# Patient Record
Sex: FEMALE | Race: WHITE | NOT HISPANIC OR LATINO | URBAN - METROPOLITAN AREA
[De-identification: names, ages, dates, MRNs, and addresses within clinical notes are randomized per-mention and may not be internally consistent; named-entity substitution may affect disease eponyms.]

---

## 2017-10-14 ENCOUNTER — HOSPITAL ENCOUNTER (OUTPATIENT)
Dept: RADIOLOGY | Facility: MEDICAL CENTER | Age: 57
End: 2017-10-14

## 2017-10-14 ENCOUNTER — APPOINTMENT (OUTPATIENT)
Dept: RADIOLOGY | Facility: MEDICAL CENTER | Age: 57
DRG: 066 | End: 2017-10-14
Attending: EMERGENCY MEDICINE
Payer: COMMERCIAL

## 2017-10-14 ENCOUNTER — RESOLUTE PROFESSIONAL BILLING HOSPITAL PROF FEE (OUTPATIENT)
Dept: HOSPITALIST | Facility: MEDICAL CENTER | Age: 57
End: 2017-10-14
Payer: COMMERCIAL

## 2017-10-14 ENCOUNTER — HOSPITAL ENCOUNTER (INPATIENT)
Facility: MEDICAL CENTER | Age: 57
LOS: 1 days | DRG: 066 | End: 2017-10-15
Attending: EMERGENCY MEDICINE | Admitting: INTERNAL MEDICINE
Payer: COMMERCIAL

## 2017-10-14 DIAGNOSIS — Z86.39 HISTORY OF DIABETES MELLITUS: ICD-10-CM

## 2017-10-14 DIAGNOSIS — R27.0 ATAXIA: ICD-10-CM

## 2017-10-14 DIAGNOSIS — Z86.39 HISTORY OF HYPOTHYROIDISM: ICD-10-CM

## 2017-10-14 DIAGNOSIS — R42 VERTIGO: ICD-10-CM

## 2017-10-14 PROBLEM — I63.9 CVA (CEREBRAL VASCULAR ACCIDENT) (HCC): Status: ACTIVE | Noted: 2017-10-14

## 2017-10-14 LAB
ALBUMIN SERPL BCP-MCNC: 3.8 G/DL (ref 3.2–4.9)
ALBUMIN/GLOB SERPL: 1.4 G/DL
ALP SERPL-CCNC: 92 U/L (ref 30–99)
ALT SERPL-CCNC: 26 U/L (ref 2–50)
ANION GAP SERPL CALC-SCNC: 7 MMOL/L (ref 0–11.9)
AST SERPL-CCNC: 36 U/L (ref 12–45)
BASOPHILS # BLD AUTO: 0.7 % (ref 0–1.8)
BASOPHILS # BLD: 0.05 K/UL (ref 0–0.12)
BILIRUB SERPL-MCNC: 0.5 MG/DL (ref 0.1–1.5)
BUN SERPL-MCNC: 12 MG/DL (ref 8–22)
CALCIUM SERPL-MCNC: 8.5 MG/DL (ref 8.5–10.5)
CHLORIDE SERPL-SCNC: 104 MMOL/L (ref 96–112)
CO2 SERPL-SCNC: 24 MMOL/L (ref 20–33)
CREAT SERPL-MCNC: 0.52 MG/DL (ref 0.5–1.4)
EOSINOPHIL # BLD AUTO: 0.07 K/UL (ref 0–0.51)
EOSINOPHIL NFR BLD: 1 % (ref 0–6.9)
ERYTHROCYTE [DISTWIDTH] IN BLOOD BY AUTOMATED COUNT: 42.5 FL (ref 35.9–50)
EST. AVERAGE GLUCOSE BLD GHB EST-MCNC: 192 MG/DL
GFR SERPL CREATININE-BSD FRML MDRD: >60 ML/MIN/1.73 M 2
GLOBULIN SER CALC-MCNC: 2.7 G/DL (ref 1.9–3.5)
GLUCOSE BLD-MCNC: 106 MG/DL (ref 65–99)
GLUCOSE BLD-MCNC: 114 MG/DL (ref 65–99)
GLUCOSE BLD-MCNC: 237 MG/DL (ref 65–99)
GLUCOSE SERPL-MCNC: 198 MG/DL (ref 65–99)
HBA1C MFR BLD: 8.3 % (ref 0–5.6)
HCT VFR BLD AUTO: 37.5 % (ref 37–47)
HGB BLD-MCNC: 12.9 G/DL (ref 12–16)
IMM GRANULOCYTES # BLD AUTO: 0.02 K/UL (ref 0–0.11)
IMM GRANULOCYTES NFR BLD AUTO: 0.3 % (ref 0–0.9)
INR PPP: 0.95 (ref 0.87–1.13)
LYMPHOCYTES # BLD AUTO: 1.23 K/UL (ref 1–4.8)
LYMPHOCYTES NFR BLD: 17.4 % (ref 22–41)
MCH RBC QN AUTO: 32.7 PG (ref 27–33)
MCHC RBC AUTO-ENTMCNC: 34.4 G/DL (ref 33.6–35)
MCV RBC AUTO: 95.2 FL (ref 81.4–97.8)
MONOCYTES # BLD AUTO: 0.36 K/UL (ref 0–0.85)
MONOCYTES NFR BLD AUTO: 5.1 % (ref 0–13.4)
NEUTROPHILS # BLD AUTO: 5.34 K/UL (ref 2–7.15)
NEUTROPHILS NFR BLD: 75.5 % (ref 44–72)
NRBC # BLD AUTO: 0 K/UL
NRBC BLD AUTO-RTO: 0 /100 WBC
PLATELET # BLD AUTO: 312 K/UL (ref 164–446)
PMV BLD AUTO: 9.4 FL (ref 9–12.9)
POTASSIUM SERPL-SCNC: 4 MMOL/L (ref 3.6–5.5)
PROT SERPL-MCNC: 6.5 G/DL (ref 6–8.2)
PROTHROMBIN TIME: 13 SEC (ref 12–14.6)
RBC # BLD AUTO: 3.94 M/UL (ref 4.2–5.4)
SODIUM SERPL-SCNC: 135 MMOL/L (ref 135–145)
TSH SERPL DL<=0.005 MIU/L-ACNC: 0.08 UIU/ML (ref 0.3–3.7)
WBC # BLD AUTO: 7.1 K/UL (ref 4.8–10.8)

## 2017-10-14 PROCEDURE — 99220 PR INITIAL OBSERVATION CARE,LEVL III: CPT | Performed by: INTERNAL MEDICINE

## 2017-10-14 PROCEDURE — 80053 COMPREHEN METABOLIC PANEL: CPT | Mod: 91

## 2017-10-14 PROCEDURE — 700102 HCHG RX REV CODE 250 W/ 637 OVERRIDE(OP): Performed by: INTERNAL MEDICINE

## 2017-10-14 PROCEDURE — 85610 PROTHROMBIN TIME: CPT

## 2017-10-14 PROCEDURE — 84443 ASSAY THYROID STIM HORMONE: CPT

## 2017-10-14 PROCEDURE — G0378 HOSPITAL OBSERVATION PER HR: HCPCS

## 2017-10-14 PROCEDURE — 85025 COMPLETE CBC W/AUTO DIFF WBC: CPT | Mod: 91

## 2017-10-14 PROCEDURE — 99285 EMERGENCY DEPT VISIT HI MDM: CPT

## 2017-10-14 PROCEDURE — 70498 CT ANGIOGRAPHY NECK: CPT

## 2017-10-14 PROCEDURE — 36415 COLL VENOUS BLD VENIPUNCTURE: CPT

## 2017-10-14 PROCEDURE — 83036 HEMOGLOBIN GLYCOSYLATED A1C: CPT

## 2017-10-14 PROCEDURE — 93005 ELECTROCARDIOGRAM TRACING: CPT | Performed by: EMERGENCY MEDICINE

## 2017-10-14 PROCEDURE — 82962 GLUCOSE BLOOD TEST: CPT | Mod: 91

## 2017-10-14 PROCEDURE — 70496 CT ANGIOGRAPHY HEAD: CPT

## 2017-10-14 PROCEDURE — A9270 NON-COVERED ITEM OR SERVICE: HCPCS | Performed by: INTERNAL MEDICINE

## 2017-10-14 PROCEDURE — 700117 HCHG RX CONTRAST REV CODE 255: Performed by: EMERGENCY MEDICINE

## 2017-10-14 PROCEDURE — 70551 MRI BRAIN STEM W/O DYE: CPT

## 2017-10-14 RX ORDER — MORPHINE SULFATE 4 MG/ML
2 INJECTION, SOLUTION INTRAMUSCULAR; INTRAVENOUS
Status: DISCONTINUED | OUTPATIENT
Start: 2017-10-14 | End: 2017-10-15 | Stop reason: HOSPADM

## 2017-10-14 RX ORDER — LABETALOL HYDROCHLORIDE 5 MG/ML
10 INJECTION, SOLUTION INTRAVENOUS EVERY 4 HOURS PRN
Status: DISCONTINUED | OUTPATIENT
Start: 2017-10-14 | End: 2017-10-15 | Stop reason: HOSPADM

## 2017-10-14 RX ORDER — DEXTROSE MONOHYDRATE 25 G/50ML
25 INJECTION, SOLUTION INTRAVENOUS
Status: DISCONTINUED | OUTPATIENT
Start: 2017-10-14 | End: 2017-10-15 | Stop reason: HOSPADM

## 2017-10-14 RX ORDER — INSULIN GLARGINE 100 [IU]/ML
1 INJECTION, SOLUTION SUBCUTANEOUS
Status: DISCONTINUED | OUTPATIENT
Start: 2017-10-15 | End: 2017-10-15 | Stop reason: HOSPADM

## 2017-10-14 RX ORDER — ONDANSETRON 2 MG/ML
4 INJECTION INTRAMUSCULAR; INTRAVENOUS EVERY 4 HOURS PRN
Status: DISCONTINUED | OUTPATIENT
Start: 2017-10-14 | End: 2017-10-15 | Stop reason: HOSPADM

## 2017-10-14 RX ORDER — ACETAMINOPHEN 325 MG/1
650 TABLET ORAL EVERY 6 HOURS PRN
Status: DISCONTINUED | OUTPATIENT
Start: 2017-10-14 | End: 2017-10-15 | Stop reason: HOSPADM

## 2017-10-14 RX ORDER — ATORVASTATIN CALCIUM 80 MG/1
80 TABLET, FILM COATED ORAL EVERY EVENING
Status: DISCONTINUED | OUTPATIENT
Start: 2017-10-14 | End: 2017-10-15 | Stop reason: HOSPADM

## 2017-10-14 RX ORDER — LEVOTHYROXINE SODIUM 112 UG/1
112 TABLET ORAL
Status: DISCONTINUED | OUTPATIENT
Start: 2017-10-15 | End: 2017-10-15 | Stop reason: HOSPADM

## 2017-10-14 RX ORDER — ASPIRIN 81 MG/1
324 TABLET, CHEWABLE ORAL DAILY
Status: DISCONTINUED | OUTPATIENT
Start: 2017-10-15 | End: 2017-10-15 | Stop reason: HOSPADM

## 2017-10-14 RX ORDER — PROMETHAZINE HYDROCHLORIDE 25 MG/1
12.5-25 SUPPOSITORY RECTAL EVERY 4 HOURS PRN
Status: DISCONTINUED | OUTPATIENT
Start: 2017-10-14 | End: 2017-10-15 | Stop reason: HOSPADM

## 2017-10-14 RX ORDER — OXYCODONE HYDROCHLORIDE 5 MG/1
2.5 TABLET ORAL
Status: DISCONTINUED | OUTPATIENT
Start: 2017-10-14 | End: 2017-10-15 | Stop reason: HOSPADM

## 2017-10-14 RX ORDER — AMOXICILLIN 250 MG
2 CAPSULE ORAL 2 TIMES DAILY
Status: DISCONTINUED | OUTPATIENT
Start: 2017-10-14 | End: 2017-10-15 | Stop reason: HOSPADM

## 2017-10-14 RX ORDER — INSULIN GLARGINE 100 [IU]/ML
7 INJECTION, SOLUTION SUBCUTANEOUS NIGHTLY
COMMUNITY

## 2017-10-14 RX ORDER — ASPIRIN 325 MG
325 TABLET ORAL DAILY
Status: DISCONTINUED | OUTPATIENT
Start: 2017-10-15 | End: 2017-10-15 | Stop reason: HOSPADM

## 2017-10-14 RX ORDER — INSULIN GLARGINE 100 [IU]/ML
3 INJECTION, SOLUTION SUBCUTANEOUS EVERY EVENING
Status: DISCONTINUED | OUTPATIENT
Start: 2017-10-14 | End: 2017-10-15 | Stop reason: HOSPADM

## 2017-10-14 RX ORDER — LEVOTHYROXINE SODIUM 112 UG/1
112 TABLET ORAL DAILY
Status: DISCONTINUED | OUTPATIENT
Start: 2017-10-14 | End: 2017-10-14

## 2017-10-14 RX ORDER — ASPIRIN 300 MG/1
300 SUPPOSITORY RECTAL DAILY
Status: DISCONTINUED | OUTPATIENT
Start: 2017-10-15 | End: 2017-10-15 | Stop reason: HOSPADM

## 2017-10-14 RX ORDER — POLYETHYLENE GLYCOL 3350 17 G/17G
1 POWDER, FOR SOLUTION ORAL
Status: DISCONTINUED | OUTPATIENT
Start: 2017-10-14 | End: 2017-10-15 | Stop reason: HOSPADM

## 2017-10-14 RX ORDER — OXYCODONE HYDROCHLORIDE 5 MG/1
5 TABLET ORAL
Status: DISCONTINUED | OUTPATIENT
Start: 2017-10-14 | End: 2017-10-15 | Stop reason: HOSPADM

## 2017-10-14 RX ORDER — PROMETHAZINE HYDROCHLORIDE 25 MG/1
12.5-25 TABLET ORAL EVERY 4 HOURS PRN
Status: DISCONTINUED | OUTPATIENT
Start: 2017-10-14 | End: 2017-10-15 | Stop reason: HOSPADM

## 2017-10-14 RX ORDER — ONDANSETRON 4 MG/1
4 TABLET, ORALLY DISINTEGRATING ORAL EVERY 4 HOURS PRN
Status: DISCONTINUED | OUTPATIENT
Start: 2017-10-14 | End: 2017-10-15 | Stop reason: HOSPADM

## 2017-10-14 RX ORDER — HYDRALAZINE HYDROCHLORIDE 20 MG/ML
10 INJECTION INTRAMUSCULAR; INTRAVENOUS
Status: DISCONTINUED | OUTPATIENT
Start: 2017-10-14 | End: 2017-10-15 | Stop reason: HOSPADM

## 2017-10-14 RX ORDER — LEVOTHYROXINE SODIUM 0.1 MG/1
100 TABLET ORAL
Status: DISCONTINUED | OUTPATIENT
Start: 2017-10-15 | End: 2017-10-14

## 2017-10-14 RX ORDER — BISACODYL 10 MG
10 SUPPOSITORY, RECTAL RECTAL
Status: DISCONTINUED | OUTPATIENT
Start: 2017-10-14 | End: 2017-10-15 | Stop reason: HOSPADM

## 2017-10-14 RX ADMIN — INSULIN LISPRO 2 UNITS: 100 INJECTION, SOLUTION INTRAVENOUS; SUBCUTANEOUS at 20:44

## 2017-10-14 RX ADMIN — INSULIN GLARGINE 3 UNITS: 100 INJECTION, SOLUTION SUBCUTANEOUS at 20:43

## 2017-10-14 RX ADMIN — IOHEXOL 100 ML: 350 INJECTION, SOLUTION INTRAVENOUS at 10:55

## 2017-10-14 ASSESSMENT — ENCOUNTER SYMPTOMS
NAUSEA: 0
COUGH: 0
CHILLS: 0
NERVOUS/ANXIOUS: 0
PHOTOPHOBIA: 0
DEPRESSION: 0
TREMORS: 0
SPUTUM PRODUCTION: 0
VOMITING: 0
CONSTIPATION: 0
SHORTNESS OF BREATH: 0
NECK PAIN: 0
BLURRED VISION: 0
SEIZURES: 0
WEAKNESS: 0
DIARRHEA: 0
FALLS: 0
FEVER: 0
VOMITING: 1
BRUISES/BLEEDS EASILY: 0
SENSORY CHANGE: 0
ABDOMINAL PAIN: 0
FOCAL WEAKNESS: 0
MYALGIAS: 0
PALPITATIONS: 0
NAUSEA: 1
CLAUDICATION: 0
BACK PAIN: 0
HALLUCINATIONS: 0
DIZZINESS: 1
DOUBLE VISION: 0
HEADACHES: 0
SPEECH CHANGE: 0
TINGLING: 0
FLANK PAIN: 0

## 2017-10-14 ASSESSMENT — COPD QUESTIONNAIRES
DO YOU EVER COUGH UP ANY MUCUS OR PHLEGM?: NO/ONLY WITH OCCASIONAL COLDS OR INFECTIONS
DURING THE PAST 4 WEEKS HOW MUCH DID YOU FEEL SHORT OF BREATH: NONE/LITTLE OF THE TIME
HAVE YOU SMOKED AT LEAST 100 CIGARETTES IN YOUR ENTIRE LIFE: NO/DON'T KNOW
COPD SCREENING SCORE: 1

## 2017-10-14 ASSESSMENT — COGNITIVE AND FUNCTIONAL STATUS - GENERAL
DRESSING REGULAR UPPER BODY CLOTHING: A LITTLE
DAILY ACTIVITIY SCORE: 17
PERSONAL GROOMING: A LITTLE
CLIMB 3 TO 5 STEPS WITH RAILING: A LOT
DRESSING REGULAR LOWER BODY CLOTHING: A LITTLE
STANDING UP FROM CHAIR USING ARMS: A LOT
CLIMB 3 TO 5 STEPS WITH RAILING: A LOT
SUGGESTED CMS G CODE MODIFIER MOBILITY: CK
PERSONAL GROOMING: A LITTLE
MOVING TO AND FROM BED TO CHAIR: A LITTLE
MOBILITY SCORE: 16
MOVING TO AND FROM BED TO CHAIR: A LITTLE
DRESSING REGULAR UPPER BODY CLOTHING: A LITTLE
MOVING FROM LYING ON BACK TO SITTING ON SIDE OF FLAT BED: A LITTLE
HELP NEEDED FOR BATHING: A LOT
WALKING IN HOSPITAL ROOM: A LOT
TOILETING: A LOT
SUGGESTED CMS G CODE MODIFIER MOBILITY: CK
HELP NEEDED FOR BATHING: A LOT
SUGGESTED CMS G CODE MODIFIER DAILY ACTIVITY: CK
MOVING FROM LYING ON BACK TO SITTING ON SIDE OF FLAT BED: A LITTLE
MOBILITY SCORE: 16
DRESSING REGULAR LOWER BODY CLOTHING: A LITTLE
SUGGESTED CMS G CODE MODIFIER DAILY ACTIVITY: CK
DAILY ACTIVITIY SCORE: 17
WALKING IN HOSPITAL ROOM: A LOT
TOILETING: A LOT
STANDING UP FROM CHAIR USING ARMS: A LOT

## 2017-10-14 ASSESSMENT — LIFESTYLE VARIABLES
HOW MANY TIMES IN THE PAST YEAR HAVE YOU HAD 5 OR MORE DRINKS IN A DAY: 3
EVER HAD A DRINK FIRST THING IN THE MORNING TO STEADY YOUR NERVES TO GET RID OF A HANGOVER: NO
AVERAGE NUMBER OF DAYS PER WEEK YOU HAVE A DRINK CONTAINING ALCOHOL: 6
DO YOU DRINK ALCOHOL: YES
EVER FELT BAD OR GUILTY ABOUT YOUR DRINKING: NO
EVER_SMOKED: NEVER
SUBSTANCE_ABUSE: 0
ON A TYPICAL DAY WHEN YOU DRINK ALCOHOL HOW MANY DRINKS DO YOU HAVE: 2
DO YOU DRINK ALCOHOL: NO
TOTAL SCORE: 1
HAVE YOU EVER FELT YOU SHOULD CUT DOWN ON YOUR DRINKING: YES
TOTAL SCORE: 1
HAVE PEOPLE ANNOYED YOU BY CRITICIZING YOUR DRINKING: NO
TOTAL SCORE: 1
CONSUMPTION TOTAL: POSITIVE

## 2017-10-14 ASSESSMENT — PAIN SCALES - GENERAL: PAINLEVEL_OUTOF10: 0

## 2017-10-14 NOTE — ED NOTES
Pt requesting to take 1 unit of novolog & 1 unit of lantus.  Pt has has her own medications at bedside.  ERP aware.

## 2017-10-14 NOTE — ED NOTES
"Pt bib [place] [facility], transfer from [facility] for neuro evaluation.  Pt states she woke up at 4am \"not feeling right.\"  Pt reports feeling \"off balance- I am so lightheaded that I can't walk correctly.\"  Negative neuro defects.  Reports blood sugar of 370 while at home- known diabetic.  Pt arrives a&ox4.  EKG done.  Blood drawn & sent to lab.  On cardiac monitor.  Chart up for ERP evaluation.   "

## 2017-10-14 NOTE — CONSULTS
NEUROLOGY NOTE    Referring Physician  ER      CHIEF COMPLAINT:  Woke up with unsteadiness-- not able to stand steady, dizzy( denied real vertigo)  Chief Complaint   Patient presents with   • Lightheadedness   • Other     sent from [facility] for neuro evaluation          IMPRESSION:    1. Acute onset of unsteadiness-- this morning-- small stroke is likely  2. Hx of DM and Hypothyrodism    PLAN/RECOMMENDATIONS:      Not a TPA candidate since this is wake up stroke  Will get MRI of brain  NIH score 1    Besides stroke, seizure, migraine or psychogenic are possible but less likely compared with small stroke in posterior fossa or thalamus or -- like lacunar stroke    Advise stroke work up     CTA of head and neck are negative     SIGNATURE:  Loren Santana          PRESENT ILLNESS:   Woke up with unsteadiness-- not able to stand steady, dizzy( denied real vertigo)    The patient Stated that she felt well yesterday. She went awakened by the fire alarm sensor, indicating that the battery was low. She got up to go to the bathroom and she typically does during the night and noted that right off the bat, she was very ataxic. She fell down back onto the bed and then when she got up again, she had to use furniture, states throughout her bedroom and into the bathroom in order to safely get to the bathroom. Once her she arrived in the bathroom she had an episode of vomiting.  PAST MEDICAL HISTORY:  Past Medical History:   Diagnosis Date   • Diabetes (CMS-HCC)     type 1   • Hypothyroid        PAST SURGICAL HISTORY:  No past surgical history on file.    FAMILY HISTORY:  Family History   Problem Relation Age of Onset   • Alcohol/Drug Neg Hx        SOCIAL HISTORY:  Social History     Social History   • Marital status:      Spouse name: N/A   • Number of children: N/A   • Years of education: N/A     Occupational History   • Not on file.     Social History Main Topics   • Smoking status: Never Smoker   • Smokeless tobacco: Never Used    • Alcohol use 3.5 oz/week     7 Glasses of wine per week   • Drug use: No   • Sexual activity: Not on file     Other Topics Concern   • Caffeine Concern No   • Stress Concern Yes     Social History Narrative   • No narrative on file     ALLERGIES:  Allergies   Allergen Reactions   • Latex      TOBHX  History   Smoking Status   • Never Smoker   Smokeless Tobacco   • Never Used     ALCHX  History   Alcohol Use   • 3.5 oz/week   • 7 Glasses of wine per week     DRUGHX  History   Drug Use No           MEDICATIONS:  Current Facility-Administered Medications   Medication Dose   • senna-docusate (PERICOLACE or SENOKOT S) 8.6-50 MG per tablet 2 Tab  2 Tab    And   • polyethylene glycol/lytes (MIRALAX) PACKET 1 Packet  1 Packet    And   • magnesium hydroxide (MILK OF MAGNESIA) suspension 30 mL  30 mL    And   • bisacodyl (DULCOLAX) suppository 10 mg  10 mg   • acetaminophen (TYLENOL) tablet 650 mg  650 mg   • Pharmacy Consult Request ...Pain Management Review      And   • oxycodone immediate-release (ROXICODONE) tablet 2.5 mg  2.5 mg    And   • oxycodone immediate-release (ROXICODONE) tablet 5 mg  5 mg    And   • morphine (pf) 4 mg/ml injection 2 mg  2 mg   • labetalol (NORMODYNE,TRANDATE) injection 10 mg  10 mg    Or   • hydrALAZINE (APRESOLINE) injection 10 mg  10 mg   • atorvastatin (LIPITOR) tablet 80 mg  80 mg   • [START ON 10/15/2017] aspirin (ASA) tablet 325 mg  325 mg    Or   • [START ON 10/15/2017] aspirin (ASA) chewable tab 324 mg  324 mg    Or   • [START ON 10/15/2017] aspirin (ASA) suppository 300 mg  300 mg   • insulin lispro (HUMALOG) injection 1-6 Units  1-6 Units   • glucose 4 g chewable tablet 16 g  16 g    And   • dextrose 50% (D50W) injection 25 mL  25 mL   • ondansetron (ZOFRAN) syringe/vial injection 4 mg  4 mg   • ondansetron (ZOFRAN ODT) dispertab 4 mg  4 mg   • promethazine (PHENERGAN) tablet 12.5-25 mg  12.5-25 mg   • promethazine (PHENERGAN) suppository 12.5-25 mg  12.5-25 mg   • prochlorperazine  "(COMPAZINE) injection 5-10 mg  5-10 mg   • oyster shell calcium/vitamin D 250-125 MG-UNIT tablet 2 Tab  2 Tab   • [START ON 10/15/2017] levothyroxine (SYNTHROID) tablet 100 mcg  100 mcg     Current Outpatient Prescriptions   Medication   • insulin glargine (LANTUS) 100 UNIT/ML Solution   • insulin aspart (NOVOLOG) 100 UNIT/ML Solution   • Calcium Carbonate-Vitamin D (CALTRATE 600+D PO)   • levothyroxine (SYNTHROID) 112 MCG TABS       REVIEW OF SYSTEM:    Constitutional: Denies fevers, Denies weight changes   Eyes: Denies changes in vision, no eye pain   Ears/Nose/Throat/Mouth: Denies nasal congestion or sore throat   Cardiovascular: Denies chest pain or palpitations   Respiratory: Denies SOB.   Gastrointestinal/Hepatic: Denies abdominal pain, nausea, vomiting, diarrhea, constipation or GI bleeding   Genitourinary: Denies bladder dysfunction, dysuria or frequency   Musculoskeletal/Rheum: Denies joint pain and swelling   Skin/Breast: Denies rash, denies breast lumps or discharge   Neurological: Denies headache, confusion, memory loss or focal weakness/parasthesias   Psychiatric: denies mood disorder   Endocrine: DM, hypothyrodisim  Heme/Oncology/Lymph Nodes: Denies enlarged lymph nodes, denies brusing or known bleeding disorder   Allergic/Immunologic: Denies hx of allergies   All other systems were reviewed and are negative (AMA/CMS criteria)       PHYSICAL AND NEUROLOGICAL EXMAINATIONS:  VITAL SIGNS: /59   Pulse 64   Temp 36.3 °C (97.4 °F)   Resp 17   Ht 1.575 m (5' 2\")   Wt 42.6 kg (94 lb)   SpO2 94%   BMI 17.19 kg/m²   CURRENT WEIGHT:   BMI: Body mass index is 17.19 kg/m².  PREVIOUS WEIGHTS:  Wt Readings from Last 25 Encounters:   10/14/17 42.6 kg (94 lb)   10/14/17 42.8 kg (94 lb 4.8 oz)   08/11/15 45.8 kg (101 lb)   10/22/14 45.7 kg (100 lb 12.8 oz)       General appearance of patient: WDWN(+) NAD(+)    EYES  o Fundus : Papilledem(-) Exudates(-) Hemorrhage(-)  Nervous System  Orientation to time, " place and person(+)  Memory normal(+)  Language: aphasia(-)  Knowledge: past(+) Current(+)  Attention(+)  Cranial Nerves  • Nerve 2: intact  • Nerve 3,4,6: intact  • Nerve 5 : intact  • Nerve 7: intact  • Nerve 8: intact  • Nerve 9 & 10: intact  • Nerve 11: intact  • Nerve 12: intact  Muscle Power and muscle tone: symmetric, normal in upper and lower  Sensory System: Pin sensation intact(+)  Reflexes: symmetric throughout  Cerebellar Function mild clumsiness over the left  Gait : Steady(-)  Heart and Vascular  Peripheral Vasucular system : Edema (-) Swelling(-)  RHB, Breathing sound clear  abdomen bowel sound normoactive  Extremities freely moveable  Joints no contracture       NEUROIMAGING: I reviewed the CT of brain       LAB:  Recent Labs      10/14/17   0445  10/14/17   0830   WBC  5.5  7.1   RBC  4.09*  3.94*   HEMOGLOBIN  13.6  12.9   HEMATOCRIT  38.7*  37.5   MCV  94.6  95.2   MCH  33.3*  32.7   MCHC  35.1  34.4   RDW  12.0  42.5   PLATELETCT  292  312   MPV  8.6  9.4           IMPRESSION:    1. Acute onset of unsteadiness-- this morning-- small stroke is likely    PLAN/RECOMMENDATIONS:      Not a TPA candidate since this is wake up stroke  Will get MRI of brain    Besides stroke, seizure, migraine or psychogenic are possible but less likely compared with small stroke in posterior fossa    Advise stroke work up     CTA of head and neck are negative     SIGNATURE:  Loren Santana

## 2017-10-14 NOTE — ED PROVIDER NOTES
ED Provider Note    ED Provider Note    Scribed for Kari Polo D.O. by Kari Polo. 10/14/2017, 8:27 AM.    Primary care provider: Pcp Pt States None  Means of arrival: EMS  History obtained from: Patient/family  History limited by: None    CHIEF COMPLAINT  Chief Complaint   Patient presents with   • Lightheadedness   • Other     sent [place] [facility] [place]        HPI  Slime Thacker is a 56 y.o. female who presents to the Emergency Department [place] [facility] [place] in need of a neuro consult and an MRI which was unable to be obtained at the prior facility. Patient gives the same history. Stating that she felt well yesterday. She's never had prior symptoms in the past. She went awakened by the fire alarm sensor, indicating that the battery was low. She got up to go to the bathroom and she typically does during the night and noted that right off the bat, she was very ataxic. She fell down back onto the bed and then when she got up again, she had to use furniture, states throughout her bedroom and into the bathroom in order to safely get to the bathroom. Once her she arrived in the bathroom she had an episode of vomiting.    Patient states that she was in her normal state of health yesterday she worked out and felt well. She went to the grocery store. She came home and ate dinner and felt fine before going to bed. Then she woke up at 4 AM because the fire alarm was sounding when she tried to get up to go to the bathroom she states she could not walk. She was very ataxic. He did up crawling to the bathroom and felt nauseated and ended up vomiting. She had difficulty redressing herself after using the bathroom. He describes vertiginous symptoms in the room spinning. She denies any headache, chest pain or shortness of breath. She does have a history of diabetes and she checked her blood sugar was elevated at 370. She requested that her  bring her to the hospital.  Movements particularly sitting up, caused increased vomiting. No urinary symptoms, fever chills or recent diarrhea. She denies any sensory changes. No isolated weaknesses. No visual changes or speech changes. No recent head trauma, falls or syncope.    REVIEW OF SYSTEMS  Review of Systems   Constitutional: Negative for fever.   HENT: Negative for congestion.    Eyes: Negative for blurred vision and double vision.   Respiratory: Negative for shortness of breath.    Cardiovascular: Negative for chest pain.   Gastrointestinal: Positive for nausea and vomiting. Negative for abdominal pain and diarrhea.   Genitourinary: Negative for dysuria and flank pain.   Musculoskeletal: Negative for falls and myalgias.   Skin: Negative for rash.   Neurological: Positive for dizziness. Negative for tingling, tremors, sensory change, speech change, focal weakness, seizures and headaches.   Endo/Heme/Allergies: Does not bruise/bleed easily.   Psychiatric/Behavioral: Negative for substance abuse.   All other systems reviewed and are negative.      PAST MEDICAL HISTORY   has a past medical history of Diabetes (CMS-[facility]) and Hypothyroid.    SURGICAL HISTORY  patient denies any surgical history    SOCIAL HISTORY  Social History   Substance Use Topics   • Smoking status: Never Smoker   • Smokeless tobacco: Never Used   • Alcohol use 3.5 oz/week     7 Glasses of wine per week      History   Drug Use No       FAMILY HISTORY  Family History   Problem Relation Age of Onset   • Alcohol/Drug Neg Hx        CURRENT MEDICATIONS  Home Medications     Reviewed by Thanh Mcleod (Pharmacy Tech) on 10/14/17 at 0933  Med List Status: Complete   Medication Last Dose Status   Calcium Carbonate-Vitamin D (CALTRATE 600+D PO) 10/13/2017 Active   insulin aspart (NOVOLOG) 100 UNIT/ML Solution 10/14/2017 Active   insulin glargine (LANTUS) 100 UNIT/ML Solution 10/13/2017 Active   levothyroxine (SYNTHROID) 112 MCG TABS 10/13/2017 Active           "      ALLERGIES  Allergies   Allergen Reactions   • Latex        PHYSICAL EXAM  VITAL SIGNS: /59   Pulse 72   Temp 36.3 °C (97.4 °F)   Resp (!) 21   Ht 1.575 m (5' 2\")   Wt 42.6 kg (94 lb)   SpO2 97%   BMI 17.19 kg/m²   Vitals reviewed.  Constitutional: Patient is oriented to person, place, and time. Appears well-developed and well-nourished. No distress.    Head: Normocephalic and atraumatic.   Ears: Normal external ears bilaterally. EOMI.  Mouth/Throat: Oropharynx is clear and moist  Eyes: Conjunctivae are normal. Pupils are equal, round, and reactive to light.   Cardiovascular: Normal rate, regular rhythm and normal heart sounds. Normal peripheral pulses.  Pulmonary/Chest: Effort normal and breath sounds normal. No respiratory distress, no wheezes, rhonchi, or rales.  Abdominal: Soft. Bowel sounds are normal. There is no tenderness, rebound or guarding, or peritoneal signs  Musculoskeletal: No edema and no tenderness.   Neurological: No focal deficits.  normal motor and sensory exam. Normal speech. Normal memory. Normal cognition. No nystagmus. Normal finger to nose on the right. Normal heel to shin on the right. With her finger to nose and heel to shin on the left, patient is slightly less smooth than on the right side.  Skin: Skin is warm and dry. No erythema. No pallor.   Psychiatric: Patient has a normal mood and affect.     LABS  Results for orders placed or performed during the hospital encounter of 10/14/17   CBC WITH DIFFERENTIAL   Result Value Ref Range    WBC 7.1 4.8 - 10.8 K/uL    RBC 3.94 (L) 4.20 - 5.40 M/uL    Hemoglobin 12.9 12.0 - 16.0 g/dL    Hematocrit 37.5 37.0 - 47.0 %    MCV 95.2 81.4 - 97.8 fL    MCH 32.7 27.0 - 33.0 pg    MCHC 34.4 33.6 - 35.0 g/dL    RDW 42.5 35.9 - 50.0 fL    Platelet Count 312 164 - 446 K/uL    MPV 9.4 9.0 - 12.9 fL    Neutrophils-Polys 75.50 (H) 44.00 - 72.00 %    Lymphocytes 17.40 (L) 22.00 - 41.00 %    Monocytes 5.10 0.00 - 13.40 %    Eosinophils 1.00 " 0.00 - 6.90 %    Basophils 0.70 0.00 - 1.80 %    Immature Granulocytes 0.30 0.00 - 0.90 %    Nucleated RBC 0.00 /100 WBC    Neutrophils (Absolute) 5.34 2.00 - 7.15 K/uL    Lymphs (Absolute) 1.23 1.00 - 4.80 K/uL    Monos (Absolute) 0.36 0.00 - 0.85 K/uL    Eos (Absolute) 0.07 0.00 - 0.51 K/uL    Baso (Absolute) 0.05 0.00 - 0.12 K/uL    Immature Granulocytes (abs) 0.02 0.00 - 0.11 K/uL    NRBC (Absolute) 0.00 K/uL   CMP   Result Value Ref Range    Sodium 135 135 - 145 mmol/L    Potassium 4.0 3.6 - 5.5 mmol/L    Chloride 104 96 - 112 mmol/L    Co2 24 20 - 33 mmol/L    Anion Gap 7.0 0.0 - 11.9    Glucose 198 (H) 65 - 99 mg/dL    Bun 12 8 - 22 mg/dL    Creatinine 0.52 0.50 - 1.40 mg/dL    Calcium 8.5 8.5 - 10.5 mg/dL    AST(SGOT) 36 12 - 45 U/L    ALT(SGPT) 26 2 - 50 U/L    Alkaline Phosphatase 92 30 - 99 U/L    Total Bilirubin 0.5 0.1 - 1.5 mg/dL    Albumin 3.8 3.2 - 4.9 g/dL    Total Protein 6.5 6.0 - 8.2 g/dL    Globulin 2.7 1.9 - 3.5 g/dL    A-G Ratio 1.4 g/dL   PROTHROMBIN TIME   Result Value Ref Range    PT 13.0 12.0 - 14.6 sec    INR 0.95 0.87 - 1.13   ESTIMATED GFR   Result Value Ref Range    GFR If African American >60 >60 mL/min/1.73 m 2    GFR If Non African American >60 >60 mL/min/1.73 m 2   EKG (ER)   Result Value Ref Range    Report       [facility] Emergency Dept.    Test Date:  2017-10-14  Pt Name:    BRITT TRAN                 Department: ER  MRN:        [id number]                      Room:        02  Gender:     F                            Technician: [id number]  :        1960                   Requested By:AVINASH CROSS  Order #:    [id number]                    Reading MD:    Measurements  Intervals                                Axis  Rate:       68                           P:          79  ME:         192                          QRS:        66  QRSD:       82                           T:          50  QT:         456  QTc:        486    Interpretive Statements  SINUS  RHYTHM  PROBABLE LEFT ATRIAL ABNORMALITY  LOW VOLTAGE WITH RIGHT AXIS DEVIATION  CONSIDER ANTERIOR INFARCT  BORDERLINE T ABNORMALITIES, ANTERIOR LEADS  No previous ECG available for comparison         All labs reviewed by me.    EKG Interpretation  Interpreted by me    Rhythm: normal sinus   Rate: 68  Axis: normal  Ectopy: none  Conduction: normal  ST Segments: no acute change  T Waves: Inversion in the anterior leads  Q Waves: none    Clinical Impression: There is no old EKG for comparison. Sinus rhythm 68. There is T-wave inversion in the anterior leads.     RADIOLOGY  CT-CTA HEAD WITH & W/O-POST PROCESS   Final Result      CT angiogram of the [place] within normal limits.      CT-CTA NECK WITH & W/O-POST PROCESSING   Final Result      CT angiogram of the neck within normal limits.      OUTSIDE IMAGES-CT HEAD   Final Result      MR-BRAIN-W/O    (Results Pending)     The radiologist's interpretation of all radiological studies have been reviewed by me.    COURSE & MEDICAL DECISION MAKING  Pertinent Labs & Imaging studies reviewed. (See chart for details)    Obtained and reviewed past medical records. Patient has no prior encounters in our EMR.  Records from previous facility. Patient was transferred from [facility]. Exam prior to arrival showed normal strength of her bilateral upper and lower extremities. Normal heel to toe testing of the right leg. The left leg is stuttering with heel to toe with jerking movements. Cranial nerves intact. Normal finger to nose was noted on the right. Left side has jerking movements in completing this task. She is noted to have a wide-based unsteady gait. EKG showed sinus bradycardia with old Q waves in V1, 2 and 3 and inverted T waves in V1 and V2. No acute ST segment elevation. CT head showed no acute cranial abnormalities. She was given IV fluids and Zofran. Labs showed an elevated glucose, negative troponin. She was not given TPA prior to arrival  "because this was considered a \"wake up stroke\". Blood sugar corrected after IV fluids. They were unable to obtain an MRI prompting her transfer to this facility. She was given aspirin as well as meclizine and another dose of Zofran prior to transfer.    8:27 AM - Patient seen and examined at bedside. Her NIH is zero. She is not a candidate for TPA as this is a \"wake up stroke\" the timing onset of symptoms is unknown. Patient is awake and alert. Daughter's ×2 and  at the bedside. Neuro exam seems improved from what was described by the prior ERP. Patient has pretty symmetric finger to nose and heel to shin on both sides. Her nausea is improved. She does not have a headache. I have paged neurology for consultation and will plan to get the patient admitted to the hospital. Regarding her EKG. She states that there is a Dr. Gamboa, an endocrinologist who she seen for many years who she thinks has access to an old EKG to perhaps compare and see if these T-wave inversions were present then. Cell phone number [phone] office number [phone]. The patient already left a message will attempt to contact him.     The differential diagnoses include but are not limited to: Posterior stroke versus positional vertigo versus TIA.    9:21 AM Dr. Capps from [facility], called as there was additional information regarding the patient's CT that was done here. The [facility] apparently, read the CT as negative. There are daytime radiologist, said that there may possibly be a small, extra-axial fluid collection, measuring about 3 mm. This could be a chronic subdural versus artifact. Radiologist recommended that the patient have an MRI. I discussed with the ER doctor called with this information in assured him, the patient will be having additional imaging.    9:14 AM discussed with Dr. Kirk, neurologist on call who at this time, recommend a CTA of the head and neck to evaluate the posterior " circulation. MRI needs to be done, but he states can be done at a later date. He will see the patient in consultation. He is aware, the patient will be admitted to the hospitalist service.     10:16 AM notified by nurse, that the patient got up to go to the bathroom and she was significantly ataxic. Patient requested to eat but have advised the nurse, she needs to be nothing by mouth until she has a swallow study upstairs. She is requesting to take her own insulin her sugar she checked herself, was 2:15. She wants to give herself one unit and I told the nurse, this is appropriate if she just documented appropriately.x    11:23 AM discussed with the hospitalist, Dr. Gilmore, who agrees to admit the patient to their service. CTA was overall unrevealing. An MRI has been ordered. He is aware, that Dr. charles has been consult on the patient and agrees to see her in consultation.    Patient be admitted in stable but guarded condition.        FINAL IMPRESSION  1. Ataxia    2. Vertigo    3. History of diabetes mellitus    4. History of hypothyroidism

## 2017-10-14 NOTE — ED NOTES
Med Rec completed per patient at bedside.  Allergies reviewed   No antibiotics within the last 30 days.    Patient stated she took 2 units of novolog today at 0400.

## 2017-10-14 NOTE — H&P
Hospital Medicine History and Physical    Date of Service  10/14/2017    Chief Complaint  Chief Complaint   Patient presents with   • Lightheadedness   • Other     sent from [facility] for neuro evaluation        History of Presenting Illness  56 y.o. female who presented 10/14/2017 with Past medical history significant for type I diabetes, insulin-dependent, hypothyroidism who is transferred here from [facility] for evaluation of unsteady gait and neurology consultation. Patient states that she was doing well yesterday and around 4 AM this morning when she tried to get to the bathroom she could not stand up and felt dizzy. She eventually was able to hang onto things and make it to the bathroom. She felt as if she couldn't walk and was having loss of balance. When she got to the bathroom she also had 3 episodes of nonbilious nonbloody emesis and nausea. Patient felt ataxic. He felt that the room was spinning. She initially thought she was hypoglycemic and she checked her blood glucose which was 379. Patient then asked her  to take her to the hospital. Patient had outside facility had a CT head and per the night radiologist was negative for any acute findings but per their day radiologist there may be a possible subdural hematoma. MRI brain ordered for further evaluation. Patient denies any shortness of breath, chest pain, headache, diarrhea/constipation, dysuria/pyuria, hematuria/hematochezia, melena. She denies any visual or speech changes, fall, syncope, head trauma. She denies any focal weakness.       Primary Care Physician  Pcp Not In Computer    Consultants  Dr. Santana - Neurology    Code Status  FULL CODE     Review of Systems  Review of Systems   Constitutional: Negative for chills, fever and malaise/fatigue.   Eyes: Negative for blurred vision, double vision and photophobia.   Respiratory: Negative for cough, sputum production and shortness of breath.    Cardiovascular: Negative for chest  pain, palpitations, claudication and leg swelling.   Gastrointestinal: Negative for abdominal pain, constipation, diarrhea, nausea and vomiting.   Genitourinary: Negative for dysuria, frequency, hematuria and urgency.   Musculoskeletal: Negative for back pain, joint pain, myalgias and neck pain.   Neurological: Positive for dizziness. Negative for tingling, tremors, sensory change, speech change, focal weakness, seizures, weakness and headaches.        Unsteady gait     Psychiatric/Behavioral: Negative for depression, hallucinations, substance abuse and suicidal ideas. The patient is not nervous/anxious.         Past Medical History  Past Medical History:   Diagnosis Date   • Diabetes (CMS-[facility])     type 1   • Hypothyroid        Surgical History  No past surgical history on file.    Medications  No current facility-administered medications on file prior to encounter.      Current Outpatient Prescriptions on File Prior to Encounter   Medication Sig Dispense Refill   • levothyroxine (SYNTHROID) 112 MCG TABS Take 112 mcg by mouth every day.         Family History  Family History   Problem Relation Age of Onset   • Alcohol/Drug Neg Hx        Social History  Social History   Substance Use Topics   • Smoking status: Never Smoker   • Smokeless tobacco: Never Used   • Alcohol use 3.5 oz/week     7 Glasses of wine per week       Allergies  Allergies   Allergen Reactions   • Latex         Physical Exam  Laboratory   Hemodynamics  Temp (24hrs), Av.3 °C (97.4 °F), Min:36.3 °C (97.4 °F), Max:36.3 °C (97.4 °F)   Temperature: 36.3 °C (97.4 °F)  Pulse  Av.7  Min: 59  Max: 75 Heart Rate (Monitored): (!) 126  Blood Pressure: 107/59, NIBP: 101/69      Respiratory      Respiration: 20, Pulse Oximetry: 98 %             Physical Exam   Constitutional: She is oriented to person, place, and time. She appears well-developed and well-nourished. No distress.   HENT:   Head: Normocephalic and atraumatic.   Right Ear: External ear  normal.   Left Ear: External ear normal.   Mouth/Throat: Oropharynx is clear and moist. No oropharyngeal exudate.   Eyes: Conjunctivae and EOM are normal. Right eye exhibits no discharge. Left eye exhibits no discharge.   Neck: Neck supple. No JVD present. No thyromegaly present.   Cardiovascular: Normal rate, regular rhythm and normal heart sounds.    No murmur heard.  Pulmonary/Chest: Effort normal and breath sounds normal. No stridor. No respiratory distress. She has no wheezes. She has no rales.   Abdominal: Soft. Bowel sounds are normal. She exhibits no distension. There is no tenderness. There is no rebound.   Musculoskeletal: She exhibits no edema.   Neurological: She is alert and oriented to person, place, and time. She has normal reflexes. No cranial nerve deficit. Coordination abnormal.   Skin: Skin is warm and dry. No rash noted. She is not diaphoretic. No erythema.   Psychiatric: She has a normal mood and affect. Her behavior is normal. Thought content normal.       Recent Labs      10/14/17   0445  10/14/17   0830   WBC  5.5  7.1   RBC  4.09*  3.94*   HEMOGLOBIN  13.6  12.9   HEMATOCRIT  38.7*  37.5   MCV  94.6  95.2   MCH  33.3*  32.7   MCHC  35.1  34.4   RDW  12.0  42.5   PLATELETCT  292  312   MPV  8.6  9.4     Recent Labs      10/14/17   0445  10/14/17   0830   SODIUM  138  135   POTASSIUM  3.8  4.0   CHLORIDE  102  104   CO2  24  24   GLUCOSE  368*  198*   BUN  14  12   CREATININE  0.7  0.52   CALCIUM  9.0  8.5     Recent Labs      10/14/17   0445  10/14/17   0830   ALTSGPT  32  26   ASTSGOT  31  36   ALKPHOSPHAT  119*  92   TBILIRUBIN  0.4  0.5   LIPASE  107   --    GLUCOSE  368*  198*     Recent Labs      10/14/17   0830   INR  0.95             Lab Results   Component Value Date    TROPONINI <0.02 10/14/2017     Urinalysis:  No results found for: SPECGRAVITY, GLUCOSEUR, KETONES, NITRITE, WBCURINE, RBCURINE, BACTERIA, EPITHELCELL     Imaging  CTA Head:  CT angiogram of the [place] within  normal limits.    CTA Neck:  CT angiogram of the neck within normal limits.     Assessment/Plan     I anticipate this patient is appropriate for observation status at this time.    CVA (cerebral vascular accident) (CMS-HCC)   Assessment & Plan    Patient noted to have unsteady gait.   CT done at outside facility did not show any acute findings per night radiologist but per their daytime radiologist shows possible small subdural hematoma. MRI brain pending. Will hold off on anticoagulation until MRI brain results.  Echo ordered  Patient CT head and neck did not show any acute findings.  Patient started on statin therapy. Lipid panel ordered.  PT/OT ordered.  Patient passed bedside swallow evaluation.  Dr. Santana from neurology consulting. Patient is not a candidate for TPA therapy.        Uncontrolled type 1 diabetes mellitus (CMS-HCC)- (present on admission)   Assessment & Plan    A1c 8.9 (Nov 2015), repeat ordered. Patient started on insulin sliding scale. Patient is on Lantus 3 units at night and 1 unit in the morning at home. Will hold presently and consider restarting based on blood glucose levels.  Patient started on hypoglycemic protocol and Accu-Cheks.        Hypothyroidism- (present on admission)   Assessment & Plan    Patient noted her TSH is 0.08, patient Synthroid dose decreased from 112 mcg to 100 mcg daily.            VTE prophylaxis: SCD.       This dictation was created using voice recognition software. The accuracy of the dictation is limited to the abilities of the software. I expect there may be some errors of grammar and possibly content.

## 2017-10-14 NOTE — ASSESSMENT & PLAN NOTE
- with acute unsteady gait; no ttPA candidate  - CT head, CTA head/neck unremarkable; MRI revealed low setting cerebellar tonsils otherwise unremarkable  - ECHO pending  - cont ASA/Statin  - PT/OT eval and recs pending  - Neuro recs appreciated

## 2017-10-15 VITALS
RESPIRATION RATE: 17 BRPM | HEART RATE: 55 BPM | BODY MASS INDEX: 17.3 KG/M2 | WEIGHT: 94 LBS | TEMPERATURE: 98.7 F | DIASTOLIC BLOOD PRESSURE: 75 MMHG | OXYGEN SATURATION: 96 % | SYSTOLIC BLOOD PRESSURE: 111 MMHG | HEIGHT: 62 IN

## 2017-10-15 LAB
ANION GAP SERPL CALC-SCNC: 5 MMOL/L (ref 0–11.9)
BUN SERPL-MCNC: 10 MG/DL (ref 8–22)
CALCIUM SERPL-MCNC: 8.3 MG/DL (ref 8.5–10.5)
CHLORIDE SERPL-SCNC: 103 MMOL/L (ref 96–112)
CHOLEST SERPL-MCNC: 124 MG/DL (ref 100–199)
CO2 SERPL-SCNC: 27 MMOL/L (ref 20–33)
CREAT SERPL-MCNC: 0.71 MG/DL (ref 0.5–1.4)
ERYTHROCYTE [DISTWIDTH] IN BLOOD BY AUTOMATED COUNT: 42.5 FL (ref 35.9–50)
GFR SERPL CREATININE-BSD FRML MDRD: >60 ML/MIN/1.73 M 2
GLUCOSE BLD-MCNC: 118 MG/DL (ref 65–99)
GLUCOSE BLD-MCNC: 95 MG/DL (ref 65–99)
GLUCOSE SERPL-MCNC: 90 MG/DL (ref 65–99)
HCT VFR BLD AUTO: 36.3 % (ref 37–47)
HDLC SERPL-MCNC: 81 MG/DL
HGB BLD-MCNC: 12.6 G/DL (ref 12–16)
LDLC SERPL CALC-MCNC: 32 MG/DL
LV EJECT FRACT  99904: 65
LV EJECT FRACT MOD 2C 99903: 59.32
LV EJECT FRACT MOD 4C 99902: 59.78
LV EJECT FRACT MOD BP 99901: 57.01
MCH RBC QN AUTO: 33.2 PG (ref 27–33)
MCHC RBC AUTO-ENTMCNC: 34.7 G/DL (ref 33.6–35)
MCV RBC AUTO: 95.8 FL (ref 81.4–97.8)
PLATELET # BLD AUTO: 284 K/UL (ref 164–446)
PMV BLD AUTO: 9.3 FL (ref 9–12.9)
POTASSIUM SERPL-SCNC: 3.5 MMOL/L (ref 3.6–5.5)
RBC # BLD AUTO: 3.79 M/UL (ref 4.2–5.4)
SODIUM SERPL-SCNC: 135 MMOL/L (ref 135–145)
TRIGL SERPL-MCNC: 56 MG/DL (ref 0–149)
WBC # BLD AUTO: 6.7 K/UL (ref 4.8–10.8)

## 2017-10-15 PROCEDURE — 93306 TTE W/DOPPLER COMPLETE: CPT

## 2017-10-15 PROCEDURE — G8988 SELF CARE GOAL STATUS: HCPCS | Mod: CH

## 2017-10-15 PROCEDURE — 80048 BASIC METABOLIC PNL TOTAL CA: CPT

## 2017-10-15 PROCEDURE — 80061 LIPID PANEL: CPT

## 2017-10-15 PROCEDURE — 82962 GLUCOSE BLOOD TEST: CPT

## 2017-10-15 PROCEDURE — 93306 TTE W/DOPPLER COMPLETE: CPT | Mod: 26 | Performed by: INTERNAL MEDICINE

## 2017-10-15 PROCEDURE — 770020 HCHG ROOM/CARE - TELE (206)

## 2017-10-15 PROCEDURE — 700102 HCHG RX REV CODE 250 W/ 637 OVERRIDE(OP): Performed by: INTERNAL MEDICINE

## 2017-10-15 PROCEDURE — A9270 NON-COVERED ITEM OR SERVICE: HCPCS | Performed by: INTERNAL MEDICINE

## 2017-10-15 PROCEDURE — 97165 OT EVAL LOW COMPLEX 30 MIN: CPT

## 2017-10-15 PROCEDURE — 36415 COLL VENOUS BLD VENIPUNCTURE: CPT

## 2017-10-15 PROCEDURE — G8989 SELF CARE D/C STATUS: HCPCS | Mod: CH

## 2017-10-15 PROCEDURE — 85027 COMPLETE CBC AUTOMATED: CPT

## 2017-10-15 PROCEDURE — G8987 SELF CARE CURRENT STATUS: HCPCS | Mod: CH

## 2017-10-15 PROCEDURE — 99239 HOSP IP/OBS DSCHRG MGMT >30: CPT | Performed by: INTERNAL MEDICINE

## 2017-10-15 RX ORDER — POTASSIUM CHLORIDE 20 MEQ/1
40 TABLET, EXTENDED RELEASE ORAL DAILY
Status: DISCONTINUED | OUTPATIENT
Start: 2017-10-15 | End: 2017-10-15 | Stop reason: HOSPADM

## 2017-10-15 RX ORDER — ATORVASTATIN CALCIUM 80 MG/1
80 TABLET, FILM COATED ORAL EVERY EVENING
Qty: 30 TAB | Refills: 0 | Status: SHIPPED | OUTPATIENT
Start: 2017-10-15 | End: 2018-04-27

## 2017-10-15 RX ORDER — ASPIRIN 325 MG
325 TABLET ORAL DAILY
Qty: 100 TAB | Refills: 0 | Status: SHIPPED | OUTPATIENT
Start: 2017-10-15 | End: 2018-04-27

## 2017-10-15 RX ADMIN — LEVOTHYROXINE SODIUM 112 MCG: 112 TABLET ORAL at 06:02

## 2017-10-15 RX ADMIN — POTASSIUM CHLORIDE 40 MEQ: 1500 TABLET, EXTENDED RELEASE ORAL at 09:01

## 2017-10-15 RX ADMIN — CALCIUM CARBONATE-CHOLECALCIFEROL TAB 250 MG-125 UNIT 2 TABLET: 250-125 TAB at 09:01

## 2017-10-15 RX ADMIN — STANDARDIZED SENNA CONCENTRATE AND DOCUSATE SODIUM 2 TABLET: 8.6; 5 TABLET, FILM COATED ORAL at 09:02

## 2017-10-15 RX ADMIN — INSULIN GLARGINE 1 UNITS: 100 INJECTION, SOLUTION SUBCUTANEOUS at 06:02

## 2017-10-15 RX ADMIN — ASPIRIN 325 MG: 325 TABLET, COATED ORAL at 09:00

## 2017-10-15 ASSESSMENT — ENCOUNTER SYMPTOMS
DIZZINESS: 1
FEVER: 0
BLOOD IN STOOL: 0
NERVOUS/ANXIOUS: 1
EYE REDNESS: 0

## 2017-10-15 ASSESSMENT — COGNITIVE AND FUNCTIONAL STATUS - GENERAL
SUGGESTED CMS G CODE MODIFIER DAILY ACTIVITY: CH
DAILY ACTIVITIY SCORE: 24

## 2017-10-15 ASSESSMENT — PAIN SCALES - GENERAL: PAINLEVEL_OUTOF10: 0

## 2017-10-15 ASSESSMENT — ACTIVITIES OF DAILY LIVING (ADL): TOILETING: INDEPENDENT

## 2017-10-15 NOTE — DIETARY
Nutrition Services:    Low BMI on Nutrition Screen. Pt is currently on a diabetic diet and per chart pt PO %. Pt states her appetite is really good and requested fresh fruit as a snacks. Pt unsure of wt loss and states  lbs (45.5 kg). Only stated wt in chart, needs measured wt. Ht: 157.5 cm, Wt: 42.638 kg - stated, BMI 17.19. Consult RD as needed. RD will re-screen weekly.      RD available prn

## 2017-10-15 NOTE — DISCHARGE INSTRUCTIONS
Discharge Instructions    Discharged to home by car with relative. Discharged via wheelchair, hospital escort: Yes.  Special equipment needed: Not Applicable    Be sure to schedule a follow-up appointment with your primary care doctor or any specialists as instructed.     Discharge Plan:   Diet Plan: Discussed  Activity Level: Discussed  Confirmed Follow up Appointment: Patient to Call and Schedule Appointment  Confirmed Symptoms Management: Discussed  Medication Reconciliation Updated: Yes  Influenza Vaccine Indication: Patient Refuses    I understand that a diet low in cholesterol, fat, and sodium is recommended for good health. Unless I have been given specific instructions below for another diet, I accept this instruction as my diet prescription.   Other diet: Regular    Special Instructions: None    · Is patient discharged on Warfarin / Coumadin?   No     · Is patient Post Blood Transfusion?  No    Depression / Suicide Risk    As you are discharged from this [facility] facility, it is important to learn how to keep safe from harming yourself.    Recognize the warning signs:  · Abrupt changes in personality, positive or negative- including increase in energy   · Giving away possessions  · Change in eating patterns- significant weight changes-  positive or negative  · Change in sleeping patterns- unable to sleep or sleeping all the time   · Unwillingness or inability to communicate  · Depression  · Unusual sadness, discouragement and loneliness  · Talk of wanting to die  · Neglect of personal appearance   · Rebelliousness- reckless behavior  · Withdrawal from people/activities they love  · Confusion- inability to concentrate     If you or a loved one observes any of these behaviors or has concerns about self-harm, here's what you can do:  · Talk about it- your feelings and reasons for harming yourself  · Remove any means that you might use to hurt yourself (examples: pills, rope, extension cords,  firearm)  · Get professional help from the community (Mental Health, Substance Abuse, psychological counseling)  · Do not be alone:Call your Safe Contact- someone whom you trust who will be there for you.  · Call your local CRISIS HOTLINE 436-7246 or 550-479-6930  · Call your local Children's Mobile Crisis Response Team [place] (590) 128-0585 or www.[place].com  · Call the toll free National Suicide Prevention Hotlines   · National Suicide Prevention Lifeline 083-047-LFQQ (8534)  · [employer] Hope Line Network 800-SUICIDE (627-9564)      Stroke Prevention  Some medical conditions and behaviors are associated with an increased chance of having a stroke. You may prevent a stroke by making healthy choices and managing medical conditions.  HOW CAN I REDUCE MY RISK OF HAVING A STROKE?   · Stay physically active. Get at least 30 minutes of activity on most or all days.  · Do not smoke. It may also be helpful to avoid exposure to secondhand smoke.  · Limit alcohol use. Moderate alcohol use is considered to be:  ¨ No more than 2 drinks per day for men.  ¨ No more than 1 drink per day for nonpregnant women.  · Eat healthy foods. This involves:  ¨ Eating 5 or more servings of fruits and vegetables a day.  ¨ Making dietary changes that address high blood pressure (hypertension), high cholesterol, diabetes, or obesity.  · Manage your cholesterol levels.  ¨ Making food choices that are high in fiber and low in saturated fat, trans fat, and cholesterol may control cholesterol levels.  ¨ Take any prescribed medicines to control cholesterol as directed by your health care provider.  · Manage your diabetes.  ¨ Controlling your carbohydrate and sugar intake is recommended to manage diabetes.  ¨ Take any prescribed medicines to control diabetes as directed by your health care provider.  · Control your hypertension.  ¨ Making food choices that are low in salt (sodium), saturated fat, trans fat, and cholesterol is recommended  to manage hypertension.  ¨ Ask your health care provider if you need treatment to lower your blood pressure. Take any prescribed medicines to control hypertension as directed by your health care provider.  ¨ If you are 18-39 years of age, have your blood pressure checked every 3-5 years. If you are 40 years of age or older, have your blood pressure checked every year.  · Maintain a healthy weight.  ¨ Reducing calorie intake and making food choices that are low in sodium, saturated fat, trans fat, and cholesterol are recommended to manage weight.  · Stop drug abuse.  · Avoid taking birth control pills.  ¨ Talk to your health care provider about the risks of taking birth control pills if you are over 35 years old, smoke, get migraines, or have ever had a blood clot.  · Get evaluated for sleep disorders (sleep apnea).  ¨ Talk to your health care provider about getting a sleep evaluation if you snore a lot or have excessive sleepiness.  · Take medicines only as directed by your health care provider.  ¨ For some people, aspirin or blood thinners (anticoagulants) are helpful in reducing the risk of forming abnormal blood clots that can lead to stroke. If you have the irregular heart rhythm of atrial fibrillation, you should be on a blood thinner unless there is a good reason you cannot take them.  ¨ Understand all your medicine instructions.  · Make sure that other conditions (such as anemia or atherosclerosis) are addressed.  SEEK IMMEDIATE MEDICAL CARE IF:   · You have sudden weakness or numbness of the face, arm, or leg, especially on one side of the body.  · Your face or eyelid droops to one side.  · You have sudden confusion.  · You have trouble speaking (aphasia) or understanding.  · You have sudden trouble seeing in one or both eyes.  · You have sudden trouble walking.  · You have dizziness.  · You have a loss of balance or coordination.  · You have a sudden, severe headache with no known cause.  · You have new  chest pain or an irregular heartbeat.  Any of these symptoms may represent a serious problem that is an emergency. Do not wait to see if the symptoms will go away. Get medical help at once. Call your local emergency services (911 in [place] Do not drive yourself to the hospital.     This information is not intended to replace advice given to you by your health care provider. Make sure you discuss any questions you have with your health care provider.     Document Released: 01/25/2006 Document Revised: 01/08/2016 Document Reviewed: 06/20/2014  [employer] Interactive Patient Education ©2016 [employer]

## 2017-10-15 NOTE — PROGRESS NOTES
Kerrie Lal Fall Risk Assessment:     Last Known Fall: No falls  Mobility: Dizziness/generalized weakness  Medications: No meds  Mental Status/LOC/Awareness: Awake, alert, and oriented to date, place, and person  Toileting Needs: Use of assistive device (Bedside commode, bedpan, urinal)  Volume/Electrolyte Status: No problems  Communication/Sensory: Visual (Glasses)/hearing deficit  Behavior: Appropriate behavior  Kerrie Lal Fall Risk Total: 6  Fall Risk Level: NO RISK    Universal Fall Precautions:  call light/belongings in reach, bed in low position and locked, wheelchairs and assistive devices out of sight, siderails up x 2, use non-slip footwear, adequate lighting, clutter free and spill free environment, educate on level of risk, educate to call for assistance    Fall Risk Level Interventions:   TRIAL (TELE 8, NEURO, MED ASPEN 5) Low Fall Risk Interventions  Place yellow fall risk ID band on patient: completed  Provide patient/family education based on risk assessment: completed  Educate patient/family to call staff for assistance when getting out of bed: completed  Place fall precaution signage outside patient door: completed      Patient Specific Interventions:     Medication: review medications with patient and family  Mental Status/LOC/Awareness: reinforce falls education, check on patient hourly, utilize bed/chair fall alarm and reinforce the use of call light  Toileting: provide frquent toileting and monitor intake and output/use of appropriate interventions  Volume/Electrolyte Status: ensure patient remains hydrated  Communication/Sensory: update plan of care on whiteboard  Behavioral: encourage patient to voice feelings, administer medication as ordered and instruct/reinforce fall program rationale  Mobility: dangle prior to standing, utilize bed/chair fall alarm and ensure bed is locked and in lowest position

## 2017-10-15 NOTE — CARE PLAN
Problem: Safety  Goal: Will remain free from injury  Outcome: PROGRESSING AS EXPECTED    Goal: Will remain free from falls  Outcome: PROGRESSING AS EXPECTED      Problem: Discharge Barriers/Planning  Goal: Patient's continuum of care needs will be met  Outcome: PROGRESSING AS EXPECTED

## 2017-10-15 NOTE — PROGRESS NOTES
Kerrie Lal Fall Risk Assessment:     Last Known Fall: No falls  Mobility: Dizziness/generalized weakness, Use of assistive device/requires assist of two people  Medications: No meds  Mental Status/LOC/Awareness: Awake, alert, and oriented to date, place, and person  Toileting Needs: Use of assistive device (Bedside commode, bedpan, urinal)  Volume/Electrolyte Status: No problems  Communication/Sensory: Visual (Glasses)/hearing deficit  Behavior: Appropriate behavior  Kerrie Lal Fall Risk Total: 9  Fall Risk Level: LOW RISK    Universal Fall Precautions:  call light/belongings in reach, bed in low position and locked, wheelchairs and assistive devices out of sight, use non-slip footwear, siderails up x 2, adequate lighting, clutter free and spill free environment, educate on level of risk, educate to call for assistance    Fall Risk Level Interventions:   TRIAL (TELE 8, NEURO, MED ASPEN 5) Low Fall Risk Interventions  Place yellow fall risk ID band on patient: completed  Provide patient/family education based on risk assessment: completed  Educate patient/family to call staff for assistance when getting out of bed: completed  Place fall precaution signage outside patient door: completed      Patient Specific Interventions:     Medication: review medications with patient and family and assess for medications that can be discontinued or dosage decreased  Mental Status/LOC/Awareness: reinforce falls education, check on patient hourly, utilize bed/chair fall alarm and reinforce the use of call light  Toileting: provide frquent toileting, monitor intake and output/use of appropriate interventions, instruct patient/family on the use of grab bars, instruct patient/family on the need to call for assistance when toileting and do not leave patient unattended in bathroom/refer to toileting scripting  Volume/Electrolyte Status: ensure patient remains hydrated, monitor blood sugars and maintain appropriate blood sugar levels  if diabetic, administer medications as ordered for nausea and vomiting, monitor abnormal lab values and ensure IV fluids are appropriate  Communication/Sensory: update plan of care on whiteboard, ensure proper positioning when transferrng/ambulating and ensure patient has glasses/contacts and hearing aids/dentures  Behavioral: encourage patient to voice feelings, engage patient in daily activities, administer medication as ordered and instruct/reinforce fall program rationale  Mobility: utilize bed/chair fall alarm, ensure bed is locked and in lowest position and provide appropriate assistive device

## 2017-10-15 NOTE — THERAPY
"Occupational Therapy Evaluation completed.   Functional Status:  Independent w/bed mobility, ambulation, and ADL's, pt is very active at baseline; Strength and coordination is all WDL, no c/o vision changes or cognitive deficits. Pt reports being at baseline and spouse agrees. Educated on FAST symptoms   Plan of Care: Patient with no further skilled OT needs in the acute care setting at this time  Discharge Recommendations:  Equipment: No Equipment Needed. Post-acute therapy Currently anticipate no further skilled therapy needs once patient is discharged from the inpatient setting.    See \"Rehab Therapy-Acute\" Patient Summary Report for complete documentation.    "

## 2017-10-15 NOTE — CARE PLAN
Problem: Nutritional:  Goal: Achieve adequate nutritional intake  Patient will consume >50% of meals  Outcome: MET Date Met: 10/15/17

## 2017-10-15 NOTE — PROGRESS NOTES
Kerrie Lal Fall Risk Assessment:     Last Known Fall: No falls  Mobility: No limitations  Medications: No meds  Mental Status/LOC/Awareness: Awake, alert, and oriented to date, place, and person  Toileting Needs: No needs  Volume/Electrolyte Status: No problems  Communication/Sensory: Visual (Glasses)/hearing deficit  Behavior: Appropriate behavior  Kerrie Lal Fall Risk Total: 3  Fall Risk Level: NO RISK    Universal Fall Precautions:  call light/belongings in reach, bed in low position and locked, wheelchairs and assistive devices out of sight, siderails up x 2, use non-slip footwear, adequate lighting, clutter free and spill free environment, educate on level of risk, educate to call for assistance    Fall Risk Level Interventions:   TRIAL (TELE 8, NEURO, MED ASPEN 5) Low Fall Risk Interventions  Place yellow fall risk ID band on patient: completed  Provide patient/family education based on risk assessment: completed  Educate patient/family to call staff for assistance when getting out of bed: completed  Place fall precaution signage outside patient door: completed      Patient Specific Interventions:     Medication: review medications with patient and family  Mental Status/LOC/Awareness: reinforce falls education, check on patient hourly and reinforce the use of call light  Toileting: provide frquent toileting, monitor intake and output/use of appropriate interventions, instruct male patients prone to dizziness to void while sitting and instruct patient/family on the use of grab bars  Volume/Electrolyte Status: monitor blood sugars and maintain appropriate blood sugar levels if diabetic and monitor abnormal lab values  Communication/Sensory: update plan of care on whiteboard, ensure proper positioning when transferrng/ambulating and ensure patient has glasses/contacts and hearing aids/dentures  Behavioral: engage patient in daily activities, administer medication as ordered and instruct/reinforce fall  program rationale  Mobility: provide comfort measures during transport, dangle prior to standing, ensure bed is locked and in lowest position and provide appropriate assistive device

## 2017-10-15 NOTE — DISCHARGE SUMMARY
DISCHARGE SUMMARY     ADMIT DATE:  10/14/2017         DISCHARGE DATE:  10/15/2017    PATIENT ID:  Name: Slime Thacker   YOB: 1960  Age: 56 y.o. female   MRN: [id number]  Address: [address] [place] [address]  Phone: [phone] (home)    DISCHARGE DIAGNOSIS:  CVA  Uncontrolled Diabetes Mellitus Type 1  Hypothyroidism    CONSULTANTS:  Neurology: Dr. Santana    CONDITION:Stable    DISPOSITION:Home    DIET: Cardiac    ACTIVITY: As tolerated     HPI/HOSPITAL COURSE:  Please see H&P for details regarding initial hospital presentation.  In summary, 55yo M F with PMHx of DM 1 and hypothyroidism was admitted for acute onset of unsteady gait.  Patient was admitted for CVA workup.  Neurology was consulted and determined to not be a tPA candidate.  Workup including lipid panel, A1c, CT head, CTA head/neck, MRI, and ECHO were obtained.  MRI was significant for low setting cerebellar tonsils but no evidence of acute ischemia.  Patient was started on ASA and statin.  A1c was found to be 8.3%.  PT/OT evaluation was conducted as gait unsteadiness resolved.  Patient was deemed safe for discharge home and to follow up with PCP in 1-2 weeks.  All other chronic conditions remained stable during hospital course.      Physical exam:  Vitals:    10/15/17 0000 10/15/17 0400 10/15/17 0800 10/15/17 1127   BP: 108/68 107/69 108/74 127/75   Pulse: (!) 53 62 60 60   Resp: 18 18 15 16   Temp: 36.4 °C (97.5 °F) 36.2 °C (97.2 °F) 36.9 °C (98.5 °F) 36.9 °C (98.5 °F)   SpO2: 97% 95% 98% 98%   Weight:       Height:         Weight/BMI: Body mass index is 17.19 kg/m².  Pulse Oximetry: 98 %, O2 (LPM): 0, O2 Delivery: None (Room Air)     Constitutional: She is oriented to person, place, and time. She appears well-developed and well-nourished. No distress.   HENT:   Head: Normocephalic and atraumatic.   Right Ear: External ear normal.   Left Ear: External ear normal.   Mouth/Throat: Oropharynx is clear and moist. No  oropharyngeal exudate.   Eyes: Conjunctivae and EOM are normal. Right eye exhibits no discharge. Left eye exhibits no discharge.   Neck: Neck supple. No JVD present. No thyromegaly present.   Cardiovascular: Normal rate, regular rhythm and normal heart sounds.    No murmur heard.  Pulmonary/Chest: Effort normal and breath sounds normal. No stridor. No respiratory distress. She has no wheezes. She has no rales.   Abdominal: Soft. Bowel sounds are normal. She exhibits no distension. There is no tenderness. There is no rebound.   Musculoskeletal: She exhibits no edema.   Neurological: She is alert and oriented to person, place, and time. She has normal reflexes. No cranial nerve deficit.  Coordination normal.   Skin: Skin is warm and dry. No rash noted. She is not diaphoretic. No erythema.   Psychiatric: She has a normal mood and affect. Her behavior is normal. Thought content normal.     PROCEDURES:  NONE    RADIOLOGY:  MR-BRAIN-W/O   Final Result      1.  Low-lying cerebellar tonsils, not meeting criteria for Chiari I malformation   2.  MRI of the brain without contrast otherwise within normal limits.      CT-CTA HEAD WITH & W/O-POST PROCESS   Final Result      CT angiogram of the [place] within normal limits.      CT-CTA NECK WITH & W/O-POST PROCESSING   Final Result      CT angiogram of the neck within normal limits.      OUTSIDE IMAGES-CT HEAD   Final Result      Echocardiogram Comp W/O cont    (Results Pending)       DISCHARGE LABS:    Recent Labs      10/14/17   0445  10/14/17   0830  10/15/17   0201   WBC  5.5  7.1  6.7   RBC  4.09*  3.94*  3.79*   HEMOGLOBIN  13.6  12.9  12.6   HEMATOCRIT  38.7*  37.5  36.3*   MCV  94.6  95.2  95.8   MCH  33.3*  32.7  33.2*   RDW  12.0  42.5  42.5   PLATELETCT  292  312  284   MPV  8.6  9.4  9.3   NEUTSPOLYS   --   75.50*   --    LYMPHOCYTES   --   17.40*   --    MONOCYTES   --   5.10   --    EOSINOPHILS   --   1.00   --    BASOPHILS   --   0.70   --      No results  found for: [id number], FOLATE, FERRITIN, IRON, TOTIRONBC    Estimated GFR/CRCL = CrCl cannot be calculated (Unknown ideal weight.).  Recent Labs      10/14/17   0445  10/14/17   0830  10/15/17   0201   SODIUM  138  135  135   POTASSIUM  3.8  4.0  3.5*   CHLORIDE  102  104  103   CO2  24  24  27   BUN  14  12  10   CREATININE  0.7  0.52  0.71   CALCIUM  9.0  8.5  8.3*   ALBUMIN  3.9  3.8   --        Recent Labs      10/14/17   0445  10/14/17   0830   ALTSGPT  32  26   ASTSGOT  31  36   ALKPHOSPHAT  119*  92   TBILIRUBIN  0.4  0.5   LIPASE  107   --    ALBUMIN  3.9  3.8   GLOBULIN   --   2.7   INR   --   0.95       @PNLABRCNT(GLUCOSE:3,POCGLUCOSE:3)  )  Lab Results   Component Value Date    HBA1C 8.3 (H) 10/14/2017    HBA1C 8.9 11/28/2015    HBA1C 8.6 08/11/2015       DISCHARGE MEDICATIONS:  (X)  Medication Reconciliation Completed   Slime Thacker   Home Medication Instructions ECTOR:[id number]    Printed on:10/15/17 1153   Medication Information                      aspirin (ASA) 325 MG Tab  Take 1 Tab by mouth every day.             atorvastatin (LIPITOR) 80 MG tablet  Take 1 Tab by mouth every evening.             Calcium Carbonate-Vitamin D (CALTRATE 600+D PO)  Take 1 Tab by mouth every day.             insulin aspart (NOVOLOG) 100 UNIT/ML Solution  Inject 2-3 Units as instructed 3 times a day before meals. 1 unit per 30 grams of carb.  Averaging 2-3 units per meal             insulin glargine (LANTUS) 100 UNIT/ML Solution  Inject 1-3 Units as instructed 2 times a day. 1 unit in the morning and 3 units at bedtime             levothyroxine (SYNTHROID) 112 MCG TABS  Take 112 mcg by mouth every day.                 INSTRUCTIONS:   The patient was instructed to return to the ER in the event of worsening symptoms. I have counseled the patient on the importance of compliance and the patient has agreed to proceed with all medical recommendations and follow up plan indicated above.   The patient understands that all  medications come with benefits and risks. Risks may include permanent injury or death and these risks can be minimized with close reassessment and monitoring.        Follow up appointment details :     F/U with PCP in 1-2 weeks    PENDING STUDIES:  NONE    Time spent on discharge day patient visit, preparing discharge paperwork and arranging for patient follow up 46 mins.

## 2017-10-15 NOTE — CARE PLAN
Problem: Infection  Goal: Will remain free from infection  Outcome: PROGRESSING AS EXPECTED    Intervention: Assess signs and symptoms of infection  None  Intervention: Implement standard precautions and perform hand washing before and after patient contact  Completed  Intervention: Give CDC handouts for infection prevention (infection prevention/hand washing, disease specific, and device specific) and document in Education  Completed

## 2017-10-15 NOTE — PROGRESS NOTES
Patient discharged.  Compete instructions reviewed with patient and . No questions at this time.   IV removed. Catheter intact.   Tele removed. Cleaned and returned.   Taken via transport. Belongings with patient.   Prescriptions called into pharmacy, no hard copies provided.

## 2017-10-31 LAB — EKG IMPRESSION: NORMAL

## 2023-06-19 PROBLEM — Z97.8 PRESENCE OF OTHER SPECIFIED DEVICES: Status: ACTIVE | Noted: 2023-03-30

## 2023-06-19 PROBLEM — E10.65 TYPE 1 DIABETES MELLITUS WITH HYPERGLYCEMIA (HCC): Status: ACTIVE | Noted: 2023-03-30
